# Patient Record
Sex: MALE | Race: WHITE | NOT HISPANIC OR LATINO | URBAN - METROPOLITAN AREA
[De-identification: names, ages, dates, MRNs, and addresses within clinical notes are randomized per-mention and may not be internally consistent; named-entity substitution may affect disease eponyms.]

---

## 2017-01-11 RX ORDER — LEVOTHYROXINE SODIUM 0.12 MG/1
TABLET ORAL
Qty: 30 TABLET | Refills: 1 | OUTPATIENT
Start: 2017-01-11

## 2017-01-23 RX ORDER — LEVOTHYROXINE SODIUM 0.12 MG/1
TABLET ORAL
Qty: 30 TABLET | Refills: 1 | OUTPATIENT
Start: 2017-01-23

## 2017-02-02 ENCOUNTER — OFFICE VISIT (OUTPATIENT)
Dept: INTERNAL MEDICINE | Facility: CLINIC | Age: 47
End: 2017-02-02

## 2017-02-02 VITALS
SYSTOLIC BLOOD PRESSURE: 110 MMHG | HEIGHT: 70 IN | WEIGHT: 167 LBS | BODY MASS INDEX: 23.91 KG/M2 | DIASTOLIC BLOOD PRESSURE: 80 MMHG

## 2017-02-02 DIAGNOSIS — E03.9 HYPOTHYROIDISM, UNSPECIFIED TYPE: Primary | ICD-10-CM

## 2017-02-02 DIAGNOSIS — F32.A DEPRESSION, UNSPECIFIED DEPRESSION TYPE: ICD-10-CM

## 2017-02-02 PROCEDURE — 99214 OFFICE O/P EST MOD 30 MIN: CPT | Performed by: NURSE PRACTITIONER

## 2017-02-02 RX ORDER — ESCITALOPRAM OXALATE 10 MG/1
10 TABLET ORAL DAILY
Qty: 30 TABLET | Refills: 5 | Status: SHIPPED | OUTPATIENT
Start: 2017-02-02

## 2017-02-02 NOTE — PROGRESS NOTES
Subjective   Jamal Greene is a 46 y.o. male who presents for f/u regarding hypothyroidism.    HPI Comments: He has been off Lexapro (initially started by psychiatry) for the past couple of years and was doing well without medication for the past 1 1/2 months. He c/o increased situational anxiety with gradual worsening depressive symptoms. He tolerated Lexapro well without side effects.    Hypothyroidism   This is a chronic problem. The current episode started more than 1 year ago. The problem has been unchanged. Associated symptoms include fatigue. Pertinent negatives include no abdominal pain, arthralgias, chest pain, chills, congestion, coughing, fever, headaches, joint swelling, nausea, sore throat, vomiting or weakness.   Depression   Visit Type: initial  Onset of symptoms: 1 to 6 months ago  Progression since onset: unchanged  Patient presents with the following symptoms: depressed mood, fatigue, irritability, malaise and nervousness/anxiety.  Patient is not experiencing: palpitations, suicidal ideas and suicidal planning.  Patient has a history of: depression (treated several years ago by psychiatry)  Treatment tried: SSRI  Compliance with treatment: good  Improvement on treatment: significant           The following portions of the patient's history were reviewed and updated as appropriate: allergies, current medications, past social history and problem list.    Past Medical History   Diagnosis Date   • Hypothyroidism          Current Outpatient Prescriptions:   •  levothyroxine (SYNTHROID, LEVOTHROID) 125 MCG tablet, TAKE ONE TABLET BY MOUTH DAILY, Disp: 30 tablet, Rfl: 2  •  escitalopram (LEXAPRO) 10 MG tablet, Take 1 tablet by mouth Daily., Disp: 30 tablet, Rfl: 5    No Known Allergies    Review of Systems   Constitutional: Positive for fatigue and irritability. Negative for chills, fever and unexpected weight change.   HENT: Negative for congestion, ear pain, postnasal drip, sinus pressure, sore  "throat and trouble swallowing.    Eyes: Negative for visual disturbance.   Respiratory: Negative for cough, chest tightness and wheezing.    Cardiovascular: Negative for chest pain, palpitations and leg swelling.   Gastrointestinal: Negative for abdominal pain, blood in stool, nausea and vomiting.   Endocrine: Negative for cold intolerance and heat intolerance.   Genitourinary: Negative for dysuria, frequency and urgency.   Musculoskeletal: Negative for arthralgias and joint swelling.   Skin: Negative for color change.   Allergic/Immunologic: Negative for immunocompromised state.   Neurological: Negative for syncope, weakness and headaches.   Hematological: Does not bruise/bleed easily.   Psychiatric/Behavioral: Positive for dysphoric mood. Negative for suicidal ideas. The patient is nervous/anxious.        Objective   Vitals:    02/02/17 1141   BP: 110/80   BP Location: Left arm   Weight: 167 lb (75.8 kg)   Height: 70\" (177.8 cm)     Physical Exam   Constitutional: He is oriented to person, place, and time. He appears well-developed and well-nourished. No distress.   HENT:   Head: Normocephalic and atraumatic.   Right Ear: External ear normal.   Left Ear: External ear normal.   Mouth/Throat: Posterior oropharyngeal erythema present.   Eyes: Conjunctivae are normal.   Neck: Normal range of motion. Neck supple.   Cardiovascular: Normal rate, regular rhythm, normal heart sounds and intact distal pulses.  Exam reveals no gallop and no friction rub.    No murmur heard.  Pulmonary/Chest: Effort normal and breath sounds normal. No respiratory distress.   Lymphadenopathy:     He has no cervical adenopathy.   Neurological: He is alert and oriented to person, place, and time.   Skin: Skin is warm and dry. No rash noted. No erythema.   Psychiatric: He has a normal mood and affect. His behavior is normal.   Nursing note and vitals reviewed.      Assessment/Plan   Jamal was seen today for hypothyroidism.    Diagnoses and all " orders for this visit:    Hypothyroidism, unspecified type  Comments:  he has been off Synthroid for 3-4 weeks, samples given of medication. He will return for labs in 4-6 wks to check TSH  Orders:  -     TSH; Future  -     CBC Auto Differential; Future  -     Comprehensive Metabolic Panel; Future    Depression, unspecified depression type  Comments:  restart Leapro and continue to monitor sx  Orders:  -     escitalopram (LEXAPRO) 10 MG tablet; Take 1 tablet by mouth Daily.    He will return for fating labs.

## 2017-03-08 RX ORDER — LEVOTHYROXINE SODIUM 0.12 MG/1
125 TABLET ORAL DAILY
Qty: 30 TABLET | Refills: 0 | Status: SHIPPED | OUTPATIENT
Start: 2017-03-08

## 2017-03-08 NOTE — TELEPHONE ENCOUNTER
----- Message from Pipo Orourke sent at 3/8/2017  3:53 PM EST -----  Contact: pt   Pt calling, was supposed to come in for labs. Pt is changing insurances and cannot come in until after getting insurance. Pt would like a refill on rx, sent into pharmacy until then. Saritha advise.    levothyroxine (SYNTHROID, LEVOTHROID) 125 MCG tablet    [facility] [address] AT [facility] ([facility]) - [phone] [facility] [phone]     #[phone]

## 2017-08-30 ENCOUNTER — HOSPITAL ENCOUNTER (OUTPATIENT)
Dept: GENERAL RADIOLOGY | Facility: HOSPITAL | Age: 47
Discharge: HOME | End: 2017-08-30
Attending: EMERGENCY MEDICINE | Admitting: EMERGENCY MEDICINE

## 2021-03-13 ENCOUNTER — IMMUNIZATION (OUTPATIENT)
Dept: VACCINE CLINIC | Facility: HOSPITAL | Age: 51
End: 2021-03-13

## 2021-03-13 PROCEDURE — 91300 HC SARSCOV02 VAC 30MCG/0.3ML IM: CPT | Performed by: INTERNAL MEDICINE

## 2021-03-13 PROCEDURE — 0001A: CPT | Performed by: INTERNAL MEDICINE

## 2021-04-03 ENCOUNTER — IMMUNIZATION (OUTPATIENT)
Dept: VACCINE CLINIC | Facility: HOSPITAL | Age: 51
End: 2021-04-03

## 2021-04-03 PROCEDURE — 0002A: CPT | Performed by: INTERNAL MEDICINE

## 2021-04-03 PROCEDURE — 91300 HC SARSCOV02 VAC 30MCG/0.3ML IM: CPT | Performed by: INTERNAL MEDICINE
